# Patient Record
Sex: FEMALE | Race: WHITE
[De-identification: names, ages, dates, MRNs, and addresses within clinical notes are randomized per-mention and may not be internally consistent; named-entity substitution may affect disease eponyms.]

---

## 2018-09-19 ENCOUNTER — HOSPITAL ENCOUNTER (INPATIENT)
Dept: HOSPITAL 62 - ER | Age: 73
LOS: 5 days | Discharge: HOME | DRG: 195 | End: 2018-09-24
Attending: INTERNAL MEDICINE | Admitting: INTERNAL MEDICINE
Payer: MEDICARE

## 2018-09-19 DIAGNOSIS — M19.90: ICD-10-CM

## 2018-09-19 DIAGNOSIS — Z90.49: ICD-10-CM

## 2018-09-19 DIAGNOSIS — G43.909: ICD-10-CM

## 2018-09-19 DIAGNOSIS — Z90.710: ICD-10-CM

## 2018-09-19 DIAGNOSIS — E03.9: ICD-10-CM

## 2018-09-19 DIAGNOSIS — Z53.8: ICD-10-CM

## 2018-09-19 DIAGNOSIS — J18.9: Primary | ICD-10-CM

## 2018-09-19 DIAGNOSIS — Z77.120: ICD-10-CM

## 2018-09-19 DIAGNOSIS — M43.6: ICD-10-CM

## 2018-09-19 DIAGNOSIS — Z88.1: ICD-10-CM

## 2018-09-19 DIAGNOSIS — Z79.899: ICD-10-CM

## 2018-09-19 DIAGNOSIS — D72.829: ICD-10-CM

## 2018-09-19 DIAGNOSIS — Z86.19: ICD-10-CM

## 2018-09-19 LAB
ADD MANUAL DIFF: NO
ALBUMIN SERPL-MCNC: 3.5 G/DL (ref 3.5–5)
ALP SERPL-CCNC: 104 U/L (ref 38–126)
ALT SERPL-CCNC: 43 U/L (ref 9–52)
ANION GAP SERPL CALC-SCNC: 11 MMOL/L (ref 5–19)
APPEARANCE UR: (no result)
APTT PPP: YELLOW S
AST SERPL-CCNC: 38 U/L (ref 14–36)
BASOPHILS # BLD AUTO: 0 10^3/UL (ref 0–0.2)
BASOPHILS NFR BLD AUTO: 0.3 % (ref 0–2)
BILIRUB DIRECT SERPL-MCNC: 0.4 MG/DL (ref 0–0.4)
BILIRUB SERPL-MCNC: 0.5 MG/DL (ref 0.2–1.3)
BILIRUB UR QL STRIP: NEGATIVE
BUN SERPL-MCNC: 12 MG/DL (ref 7–20)
CALCIUM: 10 MG/DL (ref 8.4–10.2)
CHLORIDE SERPL-SCNC: 102 MMOL/L (ref 98–107)
CK MB SERPL-MCNC: 0.56 NG/ML (ref ?–4.55)
CK SERPL-CCNC: 111 U/L (ref 30–135)
CO2 SERPL-SCNC: 23 MMOL/L (ref 22–30)
EOSINOPHIL # BLD AUTO: 0.1 10^3/UL (ref 0–0.6)
EOSINOPHIL NFR BLD AUTO: 1 % (ref 0–6)
ERYTHROCYTE [DISTWIDTH] IN BLOOD BY AUTOMATED COUNT: 13.6 % (ref 11.5–14)
GLUCOSE SERPL-MCNC: 126 MG/DL (ref 75–110)
GLUCOSE UR STRIP-MCNC: NEGATIVE MG/DL
HCT VFR BLD CALC: 39.2 % (ref 36–47)
HGB BLD-MCNC: 13.1 G/DL (ref 12–15.5)
INR PPP: 1.04
KETONES UR STRIP-MCNC: NEGATIVE MG/DL
LYMPHOCYTES # BLD AUTO: 1.3 10^3/UL (ref 0.5–4.7)
LYMPHOCYTES NFR BLD AUTO: 9.8 % (ref 13–45)
MCH RBC QN AUTO: 31.8 PG (ref 27–33.4)
MCHC RBC AUTO-ENTMCNC: 33.5 G/DL (ref 32–36)
MCV RBC AUTO: 95 FL (ref 80–97)
MONOCYTES # BLD AUTO: 0.9 10^3/UL (ref 0.1–1.4)
MONOCYTES NFR BLD AUTO: 6.6 % (ref 3–13)
NEUTROPHILS # BLD AUTO: 11.3 10^3/UL (ref 1.7–8.2)
NEUTS SEG NFR BLD AUTO: 82.3 % (ref 42–78)
NITRITE UR QL STRIP: NEGATIVE
PH UR STRIP: 6 [PH] (ref 5–9)
PLATELET # BLD: 411 10^3/UL (ref 150–450)
POTASSIUM SERPL-SCNC: 4.2 MMOL/L (ref 3.6–5)
PROT SERPL-MCNC: 6.5 G/DL (ref 6.3–8.2)
PROT UR STRIP-MCNC: NEGATIVE MG/DL
PROTHROMBIN TIME: 14.1 SEC (ref 11.4–15.4)
RBC # BLD AUTO: 4.13 10^6/UL (ref 3.72–5.28)
SODIUM SERPL-SCNC: 135.8 MMOL/L (ref 137–145)
SP GR UR STRIP: 1.01
TOTAL CELLS COUNTED % (AUTO): 100 %
TROPONIN I SERPL-MCNC: < 0.012 NG/ML
UROBILINOGEN UR-MCNC: NEGATIVE MG/DL (ref ?–2)
WBC # BLD AUTO: 13.7 10^3/UL (ref 4–10.5)

## 2018-09-19 PROCEDURE — G0378 HOSPITAL OBSERVATION PER HR: HCPCS

## 2018-09-19 PROCEDURE — 84484 ASSAY OF TROPONIN QUANT: CPT

## 2018-09-19 PROCEDURE — 93010 ELECTROCARDIOGRAM REPORT: CPT

## 2018-09-19 PROCEDURE — 71260 CT THORAX DX C+: CPT

## 2018-09-19 PROCEDURE — 87070 CULTURE OTHR SPECIMN AEROBIC: CPT

## 2018-09-19 PROCEDURE — 82553 CREATINE MB FRACTION: CPT

## 2018-09-19 PROCEDURE — 87086 URINE CULTURE/COLONY COUNT: CPT

## 2018-09-19 PROCEDURE — 83605 ASSAY OF LACTIC ACID: CPT

## 2018-09-19 PROCEDURE — 81001 URINALYSIS AUTO W/SCOPE: CPT

## 2018-09-19 PROCEDURE — 99285 EMERGENCY DEPT VISIT HI MDM: CPT

## 2018-09-19 PROCEDURE — 80053 COMPREHEN METABOLIC PANEL: CPT

## 2018-09-19 PROCEDURE — 80048 BASIC METABOLIC PNL TOTAL CA: CPT

## 2018-09-19 PROCEDURE — 85610 PROTHROMBIN TIME: CPT

## 2018-09-19 PROCEDURE — 82550 ASSAY OF CK (CPK): CPT

## 2018-09-19 PROCEDURE — 93005 ELECTROCARDIOGRAM TRACING: CPT

## 2018-09-19 PROCEDURE — 96374 THER/PROPH/DIAG INJ IV PUSH: CPT

## 2018-09-19 PROCEDURE — 85025 COMPLETE CBC W/AUTO DIFF WBC: CPT

## 2018-09-19 PROCEDURE — 96361 HYDRATE IV INFUSION ADD-ON: CPT

## 2018-09-19 PROCEDURE — 36415 COLL VENOUS BLD VENIPUNCTURE: CPT

## 2018-09-19 PROCEDURE — 82803 BLOOD GASES ANY COMBINATION: CPT

## 2018-09-19 PROCEDURE — 71045 X-RAY EXAM CHEST 1 VIEW: CPT

## 2018-09-19 PROCEDURE — 87880 STREP A ASSAY W/OPTIC: CPT

## 2018-09-19 PROCEDURE — 87040 BLOOD CULTURE FOR BACTERIA: CPT

## 2018-09-19 NOTE — ER DOCUMENT REPORT
ED Respiratory Problem





- General


Chief Complaint: Shortness Of Breath


Stated Complaint: LEFT ARM PAIN


Time Seen by Provider: 09/19/18 21:40


Notes: 





Pt. is a 73-year-old female presenting to the emergency department complaining 

of shortness of breath and chest pain.  Patient stated on 9/16 she went to 

[facility] emergency department was diagnosed with pneumonia.  Patient stated she 

was put on Cipro and has been taking it for the last 3 days.  Patient states 

that she feels as though she has had chills and a subjective fever for the last 

10 days today took her temperature and it was 102.5 Fahrenheit.  Stated she was 

having intermittent chest pressure moving down her left arm and into her left 

neck.  Currently is stating she has no chest discomfort.  Patient admits to URI 

symptoms, fever, posttussive vomiting, body aches, headache, joint pain.  

Patient denies any diarrhea or body rashes, also denies abdominal pain, 

dysuria.  Patient has an extensive history of Lyme's disease since 1991 and 

thought she was having a Lyme's flareup 2 weeks ago and called her physician in 

[place] who put her on doxy.  Patient stated she was to take Doxy twice a day 

for 2 weeks and just finished her last dose this morning.  Patient states she 

has felt worn down for the last 2 weeks.  Also admits to being exposed to "

toxic mold" in her house.  Patient denies any hospital admissions over the last 

3 months.





Past medical history: Lyme's disease, hypothyroid, migraines, arthritis 





Medications: Imitrex, Synthroid, progesterone, estrogen implant





Allergies: Rocephin (Pt. stated she was on Rocephin for over a week IV and had 

an increase in her liver enzymes. Pt. denied rash, itching, or trouble breathing

)


TRAVEL OUTSIDE OF THE [place] IN LAST 30 DAYS: No





- Related Data


Allergies/Adverse Reactions: 


 





ceftriaxone [From Rocephin] Allergy (Verified 09/20/18 05:53)


 











Past Medical History





- Social History


Smoking Status: Never Smoker


Drug Abuse: None


Lives with: Family


Family History: Reviewed & Not Pertinent


Patient has suicidal ideation: No


Patient has homicidal ideation: No


Renal/ Medical History: Denies: Hx Peritoneal Dialysis





Review of Systems





- Review of Systems


Constitutional: See HPI


EENT: See HPI


Cardiovascular: See HPI


Respiratory: See HPI


Gastrointestinal: See HPI


Genitourinary: See HPI


Female Genitourinary: No symptoms reported


Musculoskeletal: No symptoms reported


Skin: No symptoms reported


Hematologic/Lymphatic: No symptoms reported


Neurological/Psychological: No symptoms reported





Physical Exam





- Vital signs


Vitals: 


 











Temp Pulse Resp BP Pulse Ox


 


 100.5 F H  100   18   122/61   94 


 


 09/19/18 20:36  09/19/18 20:36  09/19/18 20:36  09/19/18 20:36  09/19/18 20:36














- Notes


Notes: 





GENERAL: Alert, interacts well. No acute distress.  Non-Toxic appearing


HEAD: Normocephalic, atraumatic. FROM neck but pt. does admit to pain back of 

her head down her cervical neck, denies trauma. 


EYES: Pupils equal, round, and reactive to light. Extraocular movements intact.


ENT: Oral mucosa moist, tongue midline. 


NECK: Full range of motion. Supple. Trachea midline. Pt. admits to pain left 

side of neck (paraspinal) and stated some "stiffness" in her cervical neck and 

into her occiput. Denies trauma. 


LUNGS: Slightly diminished bilateral bases no active wheeze, rales or rhonchi 

heard. No respiratory distress.


HEART: Regular rate and rhythm. No murmur


ABDOMEN: Soft, non-tender. Non-distended. Bowel sounds present in all 4 

quadrants.


EXTREMITIES: Moves all 4 extremities spontaneously. No edema, normal radial and 

dorsalis pedis pulses bilaterally. No cyanosis.


BACK: no cervical, thoracic, lumbar midline tenderness. No saddle anesthesia, 

normal distal neurovascular exam. 


NEUROLOGICAL: Alert and oriented x3. Normal speech.  


PSYCH: Normal affect, normal mood.


SKIN: Warm, dry, normal turgor. No rashes or lesions noted.








Course





- Re-evaluation


Re-evalutation: 


Discussed with Pt. due to her leukocytosis without signs of pneumonia, urinary 

tract infection, skin infection, abdominal pain (abdominal infection) a lumbar 

puncture is recommended.  Patient stated she was actually going to ask staff if 

we could do a lumbar puncture in hopes to find a source of her fever. 





LP unsuccessful, due to Pt. presention with headache and neck pain/stiffness, 

likely viral meningitis. Pt. Alert and oriented the entire time in ED, non toxic

, nonencephalopathic. Discussed Pt. presentation with Dr. Abercrombie who also 

agrees with admit. 





Dr. Cabello contacted who stated she will see the pt. in the ED, no other orders 

at this time. 








- Vital Signs


Vital signs: 


 











Temp Pulse Resp BP Pulse Ox


 


 97.9 F   100   15   119/77   95 


 


 09/20/18 03:25  09/19/18 20:54  09/20/18 05:01  09/20/18 05:01  09/20/18 05:01














- Laboratory


Result Diagrams: 


 09/19/18 22:03





 09/19/18 22:03


Laboratory results interpreted by me: 


 











  09/19/18 09/19/18 09/19/18





  22:03 22:03 22:16


 


WBC  13.7 H  


 


Seg Neutrophils %  82.3 H  


 


Lymphocytes %  9.8 L  


 


Absolute Neutrophils  11.3 H  


 


Sodium   135.8 L 


 


Glucose   126 H 


 


AST   38 H 


 


Ur Leukocyte Esterase    TRACE H














Procedures





- Lumbar Puncture


  ** Lumbar puncture


Consent obtained: Yes


Lumbar puncture pre-procedure: Sterile PPE donned, Betadine prep applied


Patient position: Sitting


Needle size: 22


Lumbar puncture location: L4


Anesthetic type: 1% Lidocaine


mL's of anesthetic: 3


Number of attempts: 2


Complications: No


Notes: 


unable to obtain CSF. Pt. tolerated procedure well.





Discharge





- Discharge


Clinical Impression: 


Fever


Qualifiers:


 Fever type: unspecified Qualified Code(s): R50.9 - Fever, unspecified





Headache


Qualifiers:


 Headache type: unspecified Headache chronicity pattern: acute headache 

Intractability: not intractable Qualified Code(s): R51 - Headache





Leukocytosis


Qualifiers:


 Leukocytosis type: unspecified Qualified Code(s): D72.829 - Elevated white 

blood cell count, unspecified





Condition: Stable


Disposition: ADMITTED AS INPATIENT


Admitting Provider: Hospitalist


Unit Admitted: Telemetry

## 2018-09-19 NOTE — EKG REPORT
SEVERITY:- BORDERLINE ECG -

SINUS RHYTHM

PROBABLE LEFT ATRIAL ABNORMALITY

:

Confirmed by: Margret Moreno MD 19-Sep-2018 22:26:48

## 2018-09-20 LAB
ADD MANUAL DIFF: NO
ANION GAP SERPL CALC-SCNC: 8 MMOL/L (ref 5–19)
BASE EXCESS BLDV CALC-SCNC: -1.7 MMOL/L
BASOPHILS # BLD AUTO: 0.1 10^3/UL (ref 0–0.2)
BASOPHILS NFR BLD AUTO: 0.6 % (ref 0–2)
BUN SERPL-MCNC: 11 MG/DL (ref 7–20)
CALCIUM: 9.5 MG/DL (ref 8.4–10.2)
CHLORIDE SERPL-SCNC: 109 MMOL/L (ref 98–107)
CO2 SERPL-SCNC: 24 MMOL/L (ref 22–30)
EOSINOPHIL # BLD AUTO: 0.4 10^3/UL (ref 0–0.6)
EOSINOPHIL NFR BLD AUTO: 3.5 % (ref 0–6)
ERYTHROCYTE [DISTWIDTH] IN BLOOD BY AUTOMATED COUNT: 14.1 % (ref 11.5–14)
GLUCOSE SERPL-MCNC: 99 MG/DL (ref 75–110)
HCO3 BLDV-SCNC: 23 MMOL/L (ref 20–32)
HCT VFR BLD CALC: 39.8 % (ref 36–47)
HGB BLD-MCNC: 13.4 G/DL (ref 12–15.5)
LYMPHOCYTES # BLD AUTO: 2.8 10^3/UL (ref 0.5–4.7)
LYMPHOCYTES NFR BLD AUTO: 25 % (ref 13–45)
MCH RBC QN AUTO: 32.5 PG (ref 27–33.4)
MCHC RBC AUTO-ENTMCNC: 33.7 G/DL (ref 32–36)
MCV RBC AUTO: 97 FL (ref 80–97)
MONOCYTES # BLD AUTO: 0.9 10^3/UL (ref 0.1–1.4)
MONOCYTES NFR BLD AUTO: 8.5 % (ref 3–13)
NEUTROPHILS # BLD AUTO: 6.9 10^3/UL (ref 1.7–8.2)
NEUTS SEG NFR BLD AUTO: 62.4 % (ref 42–78)
PCO2 BLDV: 39 MMHG (ref 35–63)
PH BLDV: 7.39 [PH] (ref 7.3–7.42)
PLATELET # BLD: 350 10^3/UL (ref 150–450)
POTASSIUM SERPL-SCNC: 4.3 MMOL/L (ref 3.6–5)
RBC # BLD AUTO: 4.13 10^6/UL (ref 3.72–5.28)
SODIUM SERPL-SCNC: 140.6 MMOL/L (ref 137–145)
TOTAL CELLS COUNTED % (AUTO): 100 %
WBC # BLD AUTO: 11.1 10^3/UL (ref 4–10.5)

## 2018-09-20 RX ADMIN — Medication SCH: at 22:43

## 2018-09-20 RX ADMIN — CLINDAMYCIN HYDROCHLORIDE SCH MG: 150 CAPSULE ORAL at 22:45

## 2018-09-20 RX ADMIN — CLINDAMYCIN HYDROCHLORIDE SCH MG: 150 CAPSULE ORAL at 14:04

## 2018-09-20 RX ADMIN — FLUTICASONE PROPIONATE SCH SPRAY: 50 SPRAY, METERED NASAL at 22:45

## 2018-09-20 RX ADMIN — CLINDAMYCIN HYDROCHLORIDE SCH MG: 150 CAPSULE ORAL at 06:12

## 2018-09-20 RX ADMIN — Medication SCH ML: at 14:14

## 2018-09-20 RX ADMIN — PREDNISONE SCH MG: 20 TABLET ORAL at 17:32

## 2018-09-20 RX ADMIN — SODIUM CHLORIDE PRN MLS/HR: 9 INJECTION, SOLUTION INTRAVENOUS at 06:12

## 2018-09-20 RX ADMIN — ENOXAPARIN SODIUM SCH MG: 40 INJECTION SUBCUTANEOUS at 10:27

## 2018-09-20 RX ADMIN — Medication SCH ML: at 06:12

## 2018-09-20 NOTE — RADIOLOGY REPORT (SQ)
Chest single view on  9/19/2018 at 10:33 PM 



CLINICAL INDICATION: Shortness of breath



COMPARISON: None



FINDINGS: There is minimal basilar atelectasis. Biapical scarring

is noted. The lungs are otherwise clear. Cardiac, hilar and

mediastinal contours are within normal limits. Pulmonary

vascularity is within normal limits. No bony abnormality is

noted.



IMPRESSION: No acute disease.

## 2018-09-20 NOTE — PDOC H&P
History of Present Illness


Admission Date/PCP: 


  18 03:08





  


Gi Mathews MD


Patient complains of: Fever


History of Present Illness: 


EMILIO ERICKSON is a 73 year old female who comes to the emergency department 

complaining of shaking chills and feeling cold intermittently for about 3 

weeks.  Last  she started with cough, subjective fever, bilateral ear pain

, sore throat, sweating, dry mouth, chills and shaking, she did not take her 

temperature, on Monday she went to [place] as per symptoms persisted and an x-

ray was done and she was told that she has pneumonia, she was placed on 

ciprofloxacin and sent home.  She was told if she was not feeling better in 48 

hours to call her family physician, she did was not in town and was sent to the 

emergency department.  Tells me that she is still have some cough with pinkish 

phlegm, before was yellowish, tells me that she to her temperature at home and 

was 102,5 in the afternoon, has been having some wheezing on and off, shortness 

of breath on and off.  Also has been complaining of headache and some stiff 

neck that by the time he went to see her has almost resolved.  Slice chest 

pressure.


Give me history of epidural injection a few months ago and since then she has 

not been feeling good woozy, dizzy.


Tells me she has history of Lyme disease with relapsed 3, she thought symptoms 

were secondary to this, her PCP placed her on doxycycline for 2 weeks, last 

pill was a couple of days ago.


Tells me that she has had mold problems in her house.


Chest x-ray in the ED negative, T-max 100.5








Past Medical History


Neurological Medical History: Reports: Migraine


Endocrine Medical History: Reports: Hypothyroidism


Musculoskeltal Medical History: Reports: Arthritis


Infectious Medical History: 


   Denies: Other


Infectious History Note: 





Lyme disease





Past Surgical History


Past Surgical History: Reports: Cholecystectomy, Hysterectomy





Social History


Lives with: Family


Smoking Status: Never Smoker


Frequency of Alcohol Use: None


Hx Recreational Drug Use: No


Hx Prescription Drug Abuse: No





Family History


Family History: Reviewed & Not Pertinent


Family History: 





Mother  at 63 years old with liver cirrhosis.  She does not know about her 

father


Parental Family History Reviewed: Yes - As above


Children Family History Reviewed: NA


Sibling(s) Family History Reviewed.: NA





Medication/Allergy


Allergies/Adverse Reactions: 


 





ceftriaxone [From Rocephin] Allergy (Verified 18 05:53)


 











Review of Systems


Review of Systems: 





As outlined above, others negative





Physical Exam


Vital Signs: 


 











Temp Pulse Resp BP Pulse Ox


 


 97.9 F   100   15   119/77   95 


 


 18 03:25  18 20:54  18 05:01  18 05:01  18 05:01











Additional comments: 





General appearance: Well-developed, well-nourished, alert and cooperative, and 

appears to be in no acute distress


Head: Normocephalic


Eyes: PEERL, EOMI, vision is grossly intact.  Ears: External auditory canal and 

tympanic membranes clear, hearing grossly intact.  Nose: No nasal discharge.  

Throat: Oral cavity and pharynx normal.  No inflammation, swelling, exudate or 

lesions.


Neck: Neck supple, nontender without lymphadenopathy, masses or thyromegaly.


Cardiac: Normal S1 and S2.  No S3, S4 or murmurs.  Rhythm is regular.  There is 

no peripheral edema, cyanosis or pallor.  Extremities are warm and well 

perfused.  Capillary refill is less than 2 seconds.  No carotid bruits.


Lungs: Bilateral mild wheezing without rales, rhonchi,  or diminished breath 

sounds.  Not using accessory muscles.


Abdomen: Positive bowel sounds.  Soft.  Nondistended, nontender.  No guarding 

or rebound.  No masses.  No hepatosplenomegaly


Extremities: No significant deformity or joint abnormality.  No edema.  

Peripheral pulses intact.  No varicosities.


Neurological: Cranial nerves II through XII grossly intact.  Strength and 

sensation symmetric and intact throughout.  Reflexes 2+ throughout.


Skin: Skin normal color, texture and turgor with no lesions or eruptions, warm 

and dry.


Psychiatric:  The mental examination revealed the patient was oriented to person

, place, and time.  The patient was able to demonstrate good judgment on recent

, without hallucinations, abnormal affect or abnormal behaviors.





Results


EKG Comments: 





 











  18





  22:03 22:03 22:03


 


WBC  13.7 H  


 


RBC  4.13  


 


Hgb  13.1  


 


Hct  39.2  


 


MCV  95  


 


MCH  31.8  


 


MCHC  33.5  


 


RDW  13.6  


 


Plt Count  411  


 


Seg Neutrophils %  82.3 H  


 


Lymphocytes %  9.8 L  


 


Monocytes %  6.6  


 


Eosinophils %  1.0  


 


Basophils %  0.3  


 


Absolute Neutrophils  11.3 H  


 


Absolute Lymphocytes  1.3  


 


Absolute Monocytes  0.9  


 


Absolute Eosinophils  0.1  


 


Absolute Basophils  0.0  


 


PT   14.1 


 


INR   1.04 


 


VBG pH   


 


VBG pCO2   


 


VBG HCO3   


 


VBG Base Excess   


 


Sodium    135.8 L


 


Potassium    4.2


 


Chloride    102


 


Carbon Dioxide    23


 


Anion Gap    11


 


BUN    12


 


Creatinine    0.74


 


Est GFR ( Amer)    > 60


 


Est GFR (Non-Af Amer)    > 60


 


Glucose    126 H


 


Calcium    10.0


 


Total Bilirubin    0.5


 


Direct Bilirubin    0.4


 


AST    38 H


 


ALT    43


 


Alkaline Phosphatase    104


 


Creatine Kinase    111


 


CK-MB (CK-2)   


 


Troponin I   


 


Total Protein    6.5


 


Albumin    3.5


 


Urine Color   


 


Urine Appearance   


 


Urine pH   


 


Ur Specific Gravity   


 


Urine Protein   


 


Urine Glucose (UA)   


 


Urine Ketones   


 


Urine Blood   


 


Urine Nitrite   


 


Urine Bilirubin   


 


Urine Urobilinogen   


 


Ur Leukocyte Esterase   


 


Urine WBC (Auto)   


 


Urine RBC (Auto)   


 


Squamous Epi Cells Auto   


 


Urine Ascorbic Acid   














  18





  22:03 22:16 00:30


 


WBC   


 


RBC   


 


Hgb   


 


Hct   


 


MCV   


 


MCH   


 


MCHC   


 


RDW   


 


Plt Count   


 


Seg Neutrophils %   


 


Lymphocytes %   


 


Monocytes %   


 


Eosinophils %   


 


Basophils %   


 


Absolute Neutrophils   


 


Absolute Lymphocytes   


 


Absolute Monocytes   


 


Absolute Eosinophils   


 


Absolute Basophils   


 


PT   


 


INR   


 


VBG pH    7.39


 


VBG pCO2    39.0


 


VBG HCO3    23.0


 


VBG Base Excess    -1.7


 


Sodium   


 


Potassium   


 


Chloride   


 


Carbon Dioxide   


 


Anion Gap   


 


BUN   


 


Creatinine   


 


Est GFR ( Amer)   


 


Est GFR (Non-Af Amer)   


 


Glucose   


 


Calcium   


 


Total Bilirubin   


 


Direct Bilirubin   


 


AST   


 


ALT   


 


Alkaline Phosphatase   


 


Creatine Kinase   


 


CK-MB (CK-2)  0.56  


 


Troponin I  < 0.012  


 


Total Protein   


 


Albumin   


 


Urine Color   YELLOW 


 


Urine Appearance   SLIGHTLY-CLOUDY 


 


Urine pH   6.0 


 


Ur Specific Gravity   1.006 


 


Urine Protein   NEGATIVE 


 


Urine Glucose (UA)   NEGATIVE 


 


Urine Ketones   NEGATIVE 


 


Urine Blood   NEGATIVE 


 


Urine Nitrite   NEGATIVE 


 


Urine Bilirubin   NEGATIVE 


 


Urine Urobilinogen   NEGATIVE 


 


Ur Leukocyte Esterase   TRACE H 


 


Urine WBC (Auto)   6 


 


Urine RBC (Auto)   1 


 


Squamous Epi Cells Auto   12 


 


Urine Ascorbic Acid   NEGATIVE 














  18





  02:41


 


WBC 


 


RBC 


 


Hgb 


 


Hct 


 


MCV 


 


MCH 


 


MCHC 


 


RDW 


 


Plt Count 


 


Seg Neutrophils % 


 


Lymphocytes % 


 


Monocytes % 


 


Eosinophils % 


 


Basophils % 


 


Absolute Neutrophils 


 


Absolute Lymphocytes 


 


Absolute Monocytes 


 


Absolute Eosinophils 


 


Absolute Basophils 


 


PT 


 


INR 


 


VBG pH 


 


VBG pCO2 


 


VBG HCO3 


 


VBG Base Excess 


 


Sodium 


 


Potassium 


 


Chloride 


 


Carbon Dioxide 


 


Anion Gap 


 


BUN 


 


Creatinine 


 


Est GFR ( Amer) 


 


Est GFR (Non-Af Amer) 


 


Glucose 


 


Calcium 


 


Total Bilirubin 


 


Direct Bilirubin 


 


AST 


 


ALT 


 


Alkaline Phosphatase 


 


Creatine Kinase 


 


CK-MB (CK-2) 


 


Troponin I  < 0.012


 


Total Protein 


 


Albumin 


 


Urine Color 


 


Urine Appearance 


 


Urine pH 


 


Ur Specific Gravity 


 


Urine Protein 


 


Urine Glucose (UA) 


 


Urine Ketones 


 


Urine Blood 


 


Urine Nitrite 


 


Urine Bilirubin 


 


Urine Urobilinogen 


 


Ur Leukocyte Esterase 


 


Urine WBC (Auto) 


 


Urine RBC (Auto) 


 


Squamous Epi Cells Auto 


 


Urine Ascorbic Acid 








Impressions: 


 





Chest X-Ray  18 21:41


IMPRESSION: No acute disease. 


 














Assessment & Plan





- Diagnosis


(1) Fever


Qualifiers: 


   Fever type: unspecified   Qualified Code(s): R50.9 - Fever, unspecified   


Is this a current diagnosis for this admission?: Yes   


Plan: 


Patient comes with respiratory symptoms and some general symptoms, states fever 

at home was 102.5, and daily 100.5.  As per her history I believe she started 

with upper respiratory infection last  which is resolving now with 

reactive airway disease.  It is unclear to me if she had pneumonia or not has 

been too soon for the infiltrates to completely resolve in the chest x-ray, 

will have to get the records, she might be diagnosed with clinical pneumonia.  

I am placing the patient on p.o. clindamycin.  Do not feel that the patient 

needs to be on IV antibiotics however we have to follow her blood cultures.


Mold at home and be playing a role in her symptomatology.  Patient is non-

smoker.  2 L of IV fluids given in the ED. Leukocytosis of 13.7 with left shift.


There was a concern in the ED for meningitis, as she was complaining of some 

headaches and neck stiffness but symptoms at the time of my evaluation are not 

consistent with it, do not feel the patient needs an LP.


Patient recently treated for a possible Lyme disease flare, completed 2 weeks 

of doxycycline.








(2) Hypothyroidism


Is this a current diagnosis for this admission?: Yes   


Plan: 


Continue with home medication








- Time


Time Spent: 30 to 50 Minutes

## 2018-09-20 NOTE — PROGRESS NOTE
Provider Note


Provider Note: 


This patient was admitted early this morning by my colleague, Evy Cabello. The 

patient is resting comfortably. She states that she came in due to generalized 

weakness and dizziness and generally not feeling well. She also states that she 

had a migraine this morning. She has received Imitrex for her migraine this 

morning. She is awake, alert, and oriented x 3. No acute distress. Heart and 

lung sounds are within normal limits. The abdomen is soft, non-tender, non-

distended.She complains of having upper respiratory symptoms. Will treat URI 

with flonase and prednisone.

## 2018-09-21 PROCEDURE — 00JU3ZZ INSPECTION OF SPINAL CANAL, PERCUTANEOUS APPROACH: ICD-10-PCS | Performed by: EMERGENCY MEDICINE

## 2018-09-21 RX ADMIN — CLINDAMYCIN HYDROCHLORIDE SCH MG: 150 CAPSULE ORAL at 22:18

## 2018-09-21 RX ADMIN — CLINDAMYCIN HYDROCHLORIDE SCH MG: 150 CAPSULE ORAL at 15:15

## 2018-09-21 RX ADMIN — Medication SCH ML: at 15:14

## 2018-09-21 RX ADMIN — LEVOFLOXACIN SCH MLS/HR: 750 INJECTION, SOLUTION INTRAVENOUS at 17:28

## 2018-09-21 RX ADMIN — BENZONATATE SCH MG: 100 CAPSULE ORAL at 22:14

## 2018-09-21 RX ADMIN — ZOLPIDEM TARTRATE PRN MG: 5 TABLET ORAL at 22:14

## 2018-09-21 RX ADMIN — IPRATROPIUM BROMIDE AND ALBUTEROL SULFATE PRN ML: 2.5; .5 SOLUTION RESPIRATORY (INHALATION) at 08:09

## 2018-09-21 RX ADMIN — SODIUM CHLORIDE PRN MLS/HR: 9 INJECTION, SOLUTION INTRAVENOUS at 22:15

## 2018-09-21 RX ADMIN — PREDNISONE SCH MG: 20 TABLET ORAL at 17:29

## 2018-09-21 RX ADMIN — FLUTICASONE PROPIONATE SCH SPRAY: 50 SPRAY, METERED NASAL at 09:59

## 2018-09-21 RX ADMIN — GUAIFENESIN PRN MG: 200 SOLUTION ORAL at 17:29

## 2018-09-21 RX ADMIN — BENZONATATE SCH MG: 100 CAPSULE ORAL at 15:14

## 2018-09-21 RX ADMIN — ZOLPIDEM TARTRATE PRN MG: 5 TABLET ORAL at 00:17

## 2018-09-21 RX ADMIN — GUAIFENESIN PRN MG: 200 SOLUTION ORAL at 09:59

## 2018-09-21 RX ADMIN — Medication SCH: at 22:14

## 2018-09-21 RX ADMIN — Medication SCH: at 06:22

## 2018-09-21 RX ADMIN — FLUTICASONE PROPIONATE SCH SPRAY: 50 SPRAY, METERED NASAL at 22:14

## 2018-09-21 RX ADMIN — ENOXAPARIN SODIUM SCH MG: 40 INJECTION SUBCUTANEOUS at 09:59

## 2018-09-21 RX ADMIN — CLINDAMYCIN HYDROCHLORIDE SCH MG: 150 CAPSULE ORAL at 06:44

## 2018-09-22 RX ADMIN — Medication SCH ML: at 21:38

## 2018-09-22 RX ADMIN — BENZONATATE SCH MG: 100 CAPSULE ORAL at 05:14

## 2018-09-22 RX ADMIN — Medication SCH ML: at 13:28

## 2018-09-22 RX ADMIN — FLUTICASONE PROPIONATE SCH SPRAY: 50 SPRAY, METERED NASAL at 09:32

## 2018-09-22 RX ADMIN — PREDNISONE SCH MG: 20 TABLET ORAL at 12:12

## 2018-09-22 RX ADMIN — FLUTICASONE PROPIONATE SCH SPRAY: 50 SPRAY, METERED NASAL at 21:39

## 2018-09-22 RX ADMIN — ENOXAPARIN SODIUM SCH: 40 INJECTION SUBCUTANEOUS at 09:37

## 2018-09-22 RX ADMIN — IPRATROPIUM BROMIDE AND ALBUTEROL SULFATE PRN ML: 2.5; .5 SOLUTION RESPIRATORY (INHALATION) at 07:57

## 2018-09-22 RX ADMIN — ZOLPIDEM TARTRATE PRN MG: 5 TABLET ORAL at 21:38

## 2018-09-22 RX ADMIN — LEVOFLOXACIN SCH MLS/HR: 750 INJECTION, SOLUTION INTRAVENOUS at 09:31

## 2018-09-22 RX ADMIN — CLINDAMYCIN HYDROCHLORIDE SCH MG: 150 CAPSULE ORAL at 13:28

## 2018-09-22 RX ADMIN — CLINDAMYCIN HYDROCHLORIDE SCH MG: 150 CAPSULE ORAL at 05:14

## 2018-09-22 RX ADMIN — CLINDAMYCIN HYDROCHLORIDE SCH MG: 150 CAPSULE ORAL at 21:38

## 2018-09-22 RX ADMIN — Medication SCH: at 05:15

## 2018-09-22 RX ADMIN — BENZONATATE SCH MG: 100 CAPSULE ORAL at 13:28

## 2018-09-22 RX ADMIN — BENZONATATE SCH MG: 100 CAPSULE ORAL at 21:38

## 2018-09-22 RX ADMIN — PREDNISONE SCH MG: 20 TABLET ORAL at 17:27

## 2018-09-22 NOTE — PDOC PROGRESS REPORT
Subjective


Progress Note for:: 09/21/18


Subjective:: 





Still c/o cough and headache


Reason For Visit: 


FEVER








Physical Exam


Vital Signs: 


 











Temp Pulse Resp BP Pulse Ox


 


 97.9 F   83   14   124/62   93 


 


 09/22/18 07:22  09/22/18 07:57  09/22/18 07:57  09/22/18 07:22  09/22/18 07:57








 Intake & Output











 09/21/18 09/22/18 09/23/18





 06:59 06:59 06:59


 


Intake Total  2650 


 


Output Total  1700 


 


Balance  950 











General appearance: PRESENT: no acute distress, well-developed, well-nourished


Head exam: PRESENT: atraumatic, normocephalic


Eye exam: PRESENT: conjunctiva pink, EOMI, PERRLA.  ABSENT: scleral icterus


Ear exam: PRESENT: normal external ear exam


Mouth exam: PRESENT: moist, tongue midline


Neck exam: ABSENT: carotid bruit, JVD, lymphadenopathy, thyromegaly


Respiratory exam: PRESENT: crackles, decreased breath sounds, rhonchi, 

unlabored.  ABSENT: rales, wheezes


Cardiovascular exam: PRESENT: RRR, +S1, +S2.  ABSENT: diastolic murmur, rubs, 

systolic murmur


Pulses: PRESENT: normal dorsalis pedis pul


Vascular exam: PRESENT: normal capillary refill


GI/Abdominal exam: PRESENT: normal bowel sounds, soft.  ABSENT: distended, 

guarding, mass, organolmegaly, rebound, tenderness


Rectal exam: PRESENT: deferred


Extremities exam: PRESENT: full ROM.  ABSENT: calf tenderness, clubbing, pedal 

edema


Neurological exam: PRESENT: alert, awake, oriented to person, oriented to place

, oriented to time, oriented to situation, CN II-XII grossly intact.  ABSENT: 

motor sensory deficit


Psychiatric exam: PRESENT: appropriate affect, normal mood.  ABSENT: homicidal 

ideation, suicidal ideation


Skin exam: PRESENT: dry, intact, warm.  ABSENT: cyanosis, rash





Results


Impressions: 


 





Chest X-Ray  09/19/18 21:41


IMPRESSION: No acute disease. 


 














Assessment & Plan





- Time


Time Spent with patient: 15-24 minutes


Medications reviewed and adjusted accordingly: Yes


Anticipated discharge: Home


Within: within 72 hours





- Inpatient Certification


Based on my medical assessment, after consideration of the patient's 

comorbidities, presenting symptoms, or acuity I expect that the services needed 

warrant INPATIENT care.: Yes


Medical Necessity: Need Close Monitoring Due to Risk of Patient Decompensation, 

Need for IV Antibiotics





- Plan Summary


Plan Summary: 





1. Possible atypical Pneumonitis- lopez add Levaquin to regimen, cont clindamycin 

and reevaluate in am


2, Fever- resolved.


3, No clinical evidence of meningitis. Lopez continue to monitor

## 2018-09-22 NOTE — PDOC PROGRESS REPORT
Subjective


Progress Note for:: 09/22/18


Subjective:: 





Still c/o cough with brownish sputum


No headache, nausea or vomiting


Reason For Visit: 


FEVER








Physical Exam


Vital Signs: 


 











Temp Pulse Resp BP Pulse Ox


 


 97.9 F   83   14   124/62   93 


 


 09/22/18 07:22  09/22/18 07:57  09/22/18 07:57  09/22/18 07:22  09/22/18 07:57








 Intake & Output











 09/21/18 09/22/18 09/23/18





 06:59 06:59 06:59


 


Intake Total  2650 


 


Output Total  1700 


 


Balance  950 











General appearance: PRESENT: no acute distress, well-developed, well-nourished


Head exam: PRESENT: atraumatic, normocephalic


Eye exam: PRESENT: conjunctiva pink, EOMI, PERRLA.  ABSENT: scleral icterus


Ear exam: PRESENT: normal external ear exam


Mouth exam: PRESENT: moist, tongue midline


Neck exam: ABSENT: carotid bruit, JVD, lymphadenopathy, thyromegaly


Respiratory exam: PRESENT: crackles, decreased breath sounds, rhonchi, 

unlabored.  ABSENT: rales, tachypnea, wheezes


Cardiovascular exam: PRESENT: RRR.  ABSENT: diastolic murmur, rubs, systolic 

murmur


Pulses: PRESENT: normal dorsalis pedis pul


Vascular exam: PRESENT: normal capillary refill


GI/Abdominal exam: PRESENT: normal bowel sounds, soft.  ABSENT: distended, 

guarding, mass, organolmegaly, rebound, tenderness


Rectal exam: PRESENT: deferred


Extremities exam: PRESENT: full ROM.  ABSENT: calf tenderness, clubbing, pedal 

edema


Neurological exam: PRESENT: alert, awake, oriented to person, oriented to place

, oriented to time, oriented to situation, CN II-XII grossly intact.  ABSENT: 

motor sensory deficit


Psychiatric exam: PRESENT: appropriate affect, normal mood.  ABSENT: homicidal 

ideation, suicidal ideation


Skin exam: PRESENT: dry, intact, warm.  ABSENT: cyanosis, rash





Results


Laboratory Results: 





 





 09/20/18 06:15 





 09/20/18 06:15 





 











MCV  97 fl (80-97)   09/20/18  06:15    


 


MCH  32.5 pg (27.0-33.4)   09/20/18  06:15    


 


MCHC  33.7 g/dL (32.0-36.0)   09/20/18  06:15    


 


RDW  14.1 % (11.5-14.0)  H  09/20/18  06:15    


 


Seg Neutrophils %  62.4 % (42-78)   09/20/18  06:15    


 


Lymphocytes %  25.0 % (13-45)   09/20/18  06:15    


 


Monocytes %  8.5 % (3-13)   09/20/18  06:15    


 


Eosinophils %  3.5 % (0-6)   09/20/18  06:15    


 


Basophils %  0.6 % (0-2)   09/20/18  06:15    


 


Absolute Neutrophils  6.9 10^3/uL (1.7-8.2)   09/20/18  06:15    


 


Absolute Lymphocytes  2.8 10^3/uL (0.5-4.7)   09/20/18  06:15    


 


Absolute Monocytes  0.9 10^3/uL (0.1-1.4)   09/20/18  06:15    


 


Absolute Eosinophils  0.4 10^3/uL (0.0-0.6)   09/20/18  06:15    


 


Absolute Basophils  0.1 10^3/uL (0.0-0.2)   09/20/18  06:15    


 


VBG pH  7.39  (7.30-7.42)   09/20/18  00:30    


 


VBG pCO2  39.0 mmHg (35-63)   09/20/18  00:30    


 


VBG HCO3  23.0 mmol/L (20-32)   09/20/18  00:30    


 


VBG Base Excess  -1.7 mmol/L  09/20/18  00:30    


 


Chloride  109 mmol/L ()  H  09/20/18  06:15    


 


Carbon Dioxide  24 mmol/L (22-30)   09/20/18  06:15    


 


Anion Gap  8  (5-19)   09/20/18  06:15    


 


Est GFR ( Amer)  > 60  (>60)   09/20/18  06:15    


 


Est GFR (Non-Af Amer)  > 60  (>60)   09/20/18  06:15    


 


Glucose  99 mg/dL ()   09/20/18  06:15    


 


Lactic Acid  1.2 mmol/L (0.7-2.1)   09/19/18  22:03    


 


Calcium  9.5 mg/dL (8.4-10.2)   09/20/18  06:15    


 


Total Bilirubin  0.5 mg/dL (0.2-1.3)   09/19/18  22:03    


 


AST  38 U/L (14-36)  H  09/19/18  22:03    


 


ALT  43 U/L (9-52)   09/19/18  22:03    


 


Alkaline Phosphatase  104 U/L ()   09/19/18  22:03    


 


Total Protein  6.5 g/dL (6.3-8.2)   09/19/18  22:03    


 


Albumin  3.5 g/dL (3.5-5.0)   09/19/18  22:03    


 


Urine Color  YELLOW   09/19/18  22:16    


 


Urine Appearance  SLIGHTLY-CLOUDY   09/19/18  22:16    


 


Urine pH  6.0  (5.0-9.0)   09/19/18  22:16    


 


Ur Specific Gravity  1.006   09/19/18  22:16    


 


Urine Protein  NEGATIVE mg/dL (NEGATIVE)   09/19/18  22:16    


 


Urine Glucose (UA)  NEGATIVE mg/dL (NEGATIVE)   09/19/18  22:16    


 


Urine Ketones  NEGATIVE mg/dL (NEGATIVE)   09/19/18  22:16    


 


Urine Blood  NEGATIVE  (NEGATIVE)   09/19/18  22:16    


 


Urine Nitrite  NEGATIVE  (NEGATIVE)   09/19/18  22:16    


 


Ur Leukocyte Esterase  TRACE  (NEGATIVE)  H  09/19/18  22:16    


 


Urine WBC (Auto)  6 /HPF  09/19/18  22:16    


 


Urine RBC (Auto)  1 /HPF  09/19/18  22:16    








 





09/20/18 06:15   Throat   Throat Culture - Final


                            NORMAL CATE


09/19/18 22:16   Clean Catch Midstream   Urine Culture - Final


                            NO GROWTH 2 DAYS





 











  09/19/18 09/19/18 09/20/18





  22:03 22:03 02:41


 


Creatine Kinase  111  


 


CK-MB (CK-2)   0.56 


 


Troponin I   < 0.012  < 0.012











Impressions: 


 





Chest X-Ray  09/19/18 21:41


IMPRESSION: No acute disease. 


 














Assessment & Plan





- Time


Time Spent with patient: 15-24 minutes


Medications reviewed and adjusted accordingly: Yes


Anticipated discharge: Home


Within: within 72 hours





- Inpatient Certification


Based on my medical assessment, after consideration of the patient's 

comorbidities, presenting symptoms, or acuity I expect that the services needed 

warrant INPATIENT care.: Yes


Medical Necessity: Need for IV Antibiotics, Risk of Complication if Not Cared 

For in Hospital





- Plan Summary


Plan Summary: 





Possible pnemonitis, atypical versus bronchitis- Cont Levaquin.


Obtain CT chest as she still has bronchial breathing


Cont steroids, bronchodilators

## 2018-09-23 LAB
ADD MANUAL DIFF: NO
ANION GAP SERPL CALC-SCNC: 5 MMOL/L (ref 5–19)
BASOPHILS # BLD AUTO: 0 10^3/UL (ref 0–0.2)
BASOPHILS NFR BLD AUTO: 0.3 % (ref 0–2)
BUN SERPL-MCNC: 13 MG/DL (ref 7–20)
CALCIUM: 9.2 MG/DL (ref 8.4–10.2)
CHLORIDE SERPL-SCNC: 108 MMOL/L (ref 98–107)
CO2 SERPL-SCNC: 26 MMOL/L (ref 22–30)
EOSINOPHIL # BLD AUTO: 0 10^3/UL (ref 0–0.6)
EOSINOPHIL NFR BLD AUTO: 0 % (ref 0–6)
ERYTHROCYTE [DISTWIDTH] IN BLOOD BY AUTOMATED COUNT: 13.8 % (ref 11.5–14)
GLUCOSE SERPL-MCNC: 120 MG/DL (ref 75–110)
HCT VFR BLD CALC: 35 % (ref 36–47)
HGB BLD-MCNC: 11.9 G/DL (ref 12–15.5)
LYMPHOCYTES # BLD AUTO: 1.7 10^3/UL (ref 0.5–4.7)
LYMPHOCYTES NFR BLD AUTO: 16.9 % (ref 13–45)
MCH RBC QN AUTO: 32.1 PG (ref 27–33.4)
MCHC RBC AUTO-ENTMCNC: 34 G/DL (ref 32–36)
MCV RBC AUTO: 95 FL (ref 80–97)
MONOCYTES # BLD AUTO: 0.5 10^3/UL (ref 0.1–1.4)
MONOCYTES NFR BLD AUTO: 4.7 % (ref 3–13)
NEUTROPHILS # BLD AUTO: 8.1 10^3/UL (ref 1.7–8.2)
NEUTS SEG NFR BLD AUTO: 78.1 % (ref 42–78)
PLATELET # BLD: 441 10^3/UL (ref 150–450)
POTASSIUM SERPL-SCNC: 4.3 MMOL/L (ref 3.6–5)
RBC # BLD AUTO: 3.7 10^6/UL (ref 3.72–5.28)
SODIUM SERPL-SCNC: 138.9 MMOL/L (ref 137–145)
TOTAL CELLS COUNTED % (AUTO): 100 %
WBC # BLD AUTO: 10.3 10^3/UL (ref 4–10.5)

## 2018-09-23 RX ADMIN — CLINDAMYCIN HYDROCHLORIDE SCH MG: 150 CAPSULE ORAL at 22:07

## 2018-09-23 RX ADMIN — FLUTICASONE PROPIONATE SCH SPRAY: 50 SPRAY, METERED NASAL at 22:08

## 2018-09-23 RX ADMIN — BENZONATATE SCH MG: 100 CAPSULE ORAL at 13:40

## 2018-09-23 RX ADMIN — CLINDAMYCIN HYDROCHLORIDE SCH MG: 150 CAPSULE ORAL at 06:05

## 2018-09-23 RX ADMIN — PREDNISONE SCH MG: 20 TABLET ORAL at 17:16

## 2018-09-23 RX ADMIN — Medication SCH ML: at 22:14

## 2018-09-23 RX ADMIN — BENZONATATE SCH MG: 100 CAPSULE ORAL at 06:05

## 2018-09-23 RX ADMIN — BENZONATATE SCH MG: 100 CAPSULE ORAL at 22:08

## 2018-09-23 RX ADMIN — ZOLPIDEM TARTRATE PRN MG: 5 TABLET ORAL at 22:08

## 2018-09-23 RX ADMIN — Medication SCH ML: at 13:40

## 2018-09-23 RX ADMIN — ENOXAPARIN SODIUM SCH: 40 INJECTION SUBCUTANEOUS at 09:24

## 2018-09-23 RX ADMIN — Medication SCH ML: at 06:05

## 2018-09-23 RX ADMIN — LEVOFLOXACIN SCH MLS/HR: 750 INJECTION, SOLUTION INTRAVENOUS at 09:22

## 2018-09-23 RX ADMIN — CLINDAMYCIN HYDROCHLORIDE SCH MG: 150 CAPSULE ORAL at 13:40

## 2018-09-23 RX ADMIN — FLUTICASONE PROPIONATE SCH SPRAY: 50 SPRAY, METERED NASAL at 09:23

## 2018-09-23 RX ADMIN — PREDNISONE SCH MG: 20 TABLET ORAL at 09:23

## 2018-09-23 NOTE — PDOC PROGRESS REPORT
Subjective


Progress Note for:: 09/23/18


Subjective:: 





Still c/o cough with brownish sputum


No headache, nausea or vomiting


Reason For Visit: 


FEVER








Physical Exam


Vital Signs: 


 











Temp Pulse Resp BP Pulse Ox


 


 98.1 F   76   16   123/59 L  97 


 


 09/23/18 16:00  09/23/18 16:00  09/23/18 16:00  09/23/18 16:00  09/23/18 16:00








 Intake & Output











 09/22/18 09/23/18 09/24/18





 06:59 06:59 06:59


 


Intake Total 2650 3111 150


 


Output Total 1700 2900 


 


Balance 950 211 150











General appearance: PRESENT: no acute distress, well-developed, well-nourished


Head exam: PRESENT: atraumatic, normocephalic


Eye exam: PRESENT: conjunctiva pink, EOMI, PERRLA.  ABSENT: scleral icterus


Ear exam: PRESENT: normal external ear exam


Mouth exam: PRESENT: moist, tongue midline


Neck exam: ABSENT: carotid bruit, JVD, lymphadenopathy, thyromegaly


Respiratory exam: PRESENT: crackles, decreased breath sounds, rhonchi.  ABSENT: 

rales, wheezes


Cardiovascular exam: PRESENT: RRR.  ABSENT: diastolic murmur, rubs, systolic 

murmur


Pulses: PRESENT: normal dorsalis pedis pul


Vascular exam: PRESENT: normal capillary refill


GI/Abdominal exam: PRESENT: normal bowel sounds, soft.  ABSENT: distended, 

guarding, mass, organolmegaly, rebound, tenderness


Rectal exam: PRESENT: deferred


Extremities exam: PRESENT: full ROM.  ABSENT: calf tenderness, clubbing, pedal 

edema


Neurological exam: PRESENT: alert, awake, oriented to person, oriented to place

, oriented to time, oriented to situation, CN II-XII grossly intact.  ABSENT: 

motor sensory deficit


Psychiatric exam: PRESENT: appropriate affect, normal mood.  ABSENT: homicidal 

ideation, suicidal ideation


Skin exam: PRESENT: dry, intact, warm.  ABSENT: cyanosis, rash





Results


Laboratory Results: 


 





 09/23/18 04:25 





 09/23/18 04:25 





 











  09/23/18 09/23/18





  04:25 04:25


 


WBC  10.3 


 


RBC  3.70 L 


 


Hgb  11.9 L 


 


Hct  35.0 L 


 


MCV  95 


 


MCH  32.1 


 


MCHC  34.0 


 


RDW  13.8 


 


Plt Count  441 


 


Seg Neutrophils %  78.1 H 


 


Lymphocytes %  16.9 


 


Monocytes %  4.7 


 


Eosinophils %  0.0 


 


Basophils %  0.3 


 


Absolute Neutrophils  8.1 


 


Absolute Lymphocytes  1.7 


 


Absolute Monocytes  0.5 


 


Absolute Eosinophils  0.0 


 


Absolute Basophils  0.0 


 


Sodium   138.9


 


Potassium   4.3


 


Chloride   108 H


 


Carbon Dioxide   26


 


Anion Gap   5


 


BUN   13


 


Creatinine   0.59


 


Est GFR ( Amer)   > 60


 


Est GFR (Non-Af Amer)   > 60


 


Glucose   120 H


 


Calcium   9.2











Impressions: 


 





Chest X-Ray  09/19/18 21:41


IMPRESSION: No acute disease. 


 








Chest CT  09/22/18 00:00


IMPRESSION:  NORMAL CT OF THE CHEST WITH IV CONTRAST.


 














Assessment & Plan





- Time


Time Spent with patient: 15-24 minutes


Medications reviewed and adjusted accordingly: Yes


Anticipated discharge: Home


Within: within 24 hours





- Inpatient Certification


Based on my medical assessment, after consideration of the patient's 

comorbidities, presenting symptoms, or acuity I expect that the services needed 

warrant INPATIENT care.: Yes


Medical Necessity: Failure to Improve With Outpatient Therapy, Need Close 

Monitoring Due to Risk of Patient Decompensation, Need for Nebulizer Therapy 

and Monitoring of Response





- Plan Summary


Plan Summary: 








Possible pnemonitis, atypical versus bronchitis- Cont Levaquin.


CT chest negative.


Breathing is improved on steroids


Cont steroids, bronchodilators


Plan to dc in am

## 2018-09-24 VITALS — SYSTOLIC BLOOD PRESSURE: 123 MMHG | DIASTOLIC BLOOD PRESSURE: 59 MMHG

## 2018-09-24 RX ADMIN — BENZONATATE SCH MG: 100 CAPSULE ORAL at 06:18

## 2018-09-24 RX ADMIN — FLUTICASONE PROPIONATE SCH SPRAY: 50 SPRAY, METERED NASAL at 09:17

## 2018-09-24 RX ADMIN — Medication SCH ML: at 06:18

## 2018-09-24 RX ADMIN — ENOXAPARIN SODIUM SCH: 40 INJECTION SUBCUTANEOUS at 09:20

## 2018-09-24 RX ADMIN — PREDNISONE SCH MG: 20 TABLET ORAL at 09:17

## 2018-09-24 RX ADMIN — LEVOFLOXACIN SCH MLS/HR: 750 INJECTION, SOLUTION INTRAVENOUS at 09:17

## 2018-09-24 RX ADMIN — IPRATROPIUM BROMIDE AND ALBUTEROL SULFATE PRN ML: 2.5; .5 SOLUTION RESPIRATORY (INHALATION) at 08:52

## 2018-09-24 RX ADMIN — CLINDAMYCIN HYDROCHLORIDE SCH MG: 150 CAPSULE ORAL at 06:18

## 2018-09-24 NOTE — PDOC DISCHARGE SUMMARY
General





- Admit/Disc Date/PCP


Admission Date/Primary Care Provider: 


  09/21/18 16:52





  





Discharge Date: 09/24/18





- Additional Information


Resuscitation Status: Full Code


Discharge Diet: Regular


Discharge Activity: Activity As Tolerated


Prescriptions: 


Benzonatate [Tessalon Perles 100 mg Capsule] 100 mg PO Q8 #20 capsule


Clindamycin HCl [Cleocin 150 mg Capsule] 300 mg PO Q8 #14 capsule


Fluticasone Propionate [Flonase Nasal Spray 50 Mcg/Spray 16 gm] 1 spray NASL 

Q12 #1 spray.pump


Levofloxacin [Levaquin 500 mg Tablet] 500 mg PO DAILY #5 tablet


Methylprednisolone [Medrol Dosepack (4 mg/Tab) 21 Tab/Dosepak] 4 mg PO ASDIR 

PRN #21 tab.ds.pk


 PRN Reason: 


Home Medications: 








Acetaminophen [Tylenol Extra Strength] 500 mg PO Q4HP PRN 09/20/18 


Ascorbic Acid [Vitamin C 500 mg Tablet] 500 mg PO BID 09/20/18 


Calcium Carbonate/Vitamin D3 [Calcium 600 + Vit D Tablet] 1 tab PO BID 09/20/18 


Sumatriptan Succinate [Imitrex 100 mg Tablet] 33.3 mg PO ASDIR PRN 09/20/18 


Thyroid,Pork [[place] Thyroid] 30 mg PO QAM 09/20/18 


Zolpidem Tartrate [Ambien 5 mg Tablet] 5 mg PO QHS 09/20/18 


Benzonatate [Tessalon Perles 100 mg Capsule] 100 mg PO Q8 #20 capsule 09/24/18 


Clindamycin HCl [Cleocin 150 mg Capsule] 300 mg PO Q8 #14 capsule 09/24/18 


Fluticasone Propionate [Flonase Nasal Spray 50 Mcg/Spray 16 gm] 1 spray NASL 

Q12 #1 spray.pump 09/24/18 


Guaifenesin [Robitussin Syrup 200 mg/10 ml Ud Cup] 200 mg PO Q4HP PRN  udc 09/24 /18 


Levofloxacin [Levaquin 500 mg Tablet] 500 mg PO DAILY #5 tablet 09/24/18 


Methylprednisolone [Medrol Dosepack (4 mg/Tab) 21 Tab/Dosepak] 4 mg PO ASDIR 

PRN #21 tab.ds.pk 09/24/18 











History of Present Illness


History of Present Illness: 


EMILIO ERICKSON is a 73 year old female


ALEXIALEXANDER MIRELESMOND is a 73 year old female who comes to the emergency department 

complaining of shaking chills and feeling cold intermittently for about 3 

weeks.  Last Sunday she started with cough, subjective fever, bilateral ear pain

, sore throat, sweating, dry mouth, chills and shaking, she did not take her 

temperature, on Monday she went to [facility] as per symptoms persisted and an x-

ray was done and she was told that she has pneumonia, she was placed on 

ciprofloxacin and sent home.  She was told if she was not feeling better in 48 

hours to call her family physician, she did was not in town and was sent to the 

emergency department.  Tells me that she is still have some cough with pinkish 

phlegm, before was yellowish, tells me that she to her temperature at home and 

was 102,5 in the afternoon, has been having some wheezing on and off, shortness 

of breath on and off.  Also has been complaining of headache and some stiff 

neck that by the time he went to see her has almost re


Chest x-ray in the ED negative, T-max 100.5





Hospital Course


Hospital Course: 





Patient was admitted with difficulty breathing and she was thought to have 

either partially treated pneumonia or atypical pneumonitis.  She was started on 

antibiotics.  She was apparently febrile on initial presentation.  Subsequent 

CT scan done reveals no evidence of any infiltrates.  Patient was placed on 

steroids and she actually improved once steroid was started on a higher dose.  

She was also placed on Levaquin in addition to clindamycin and patient 

continues to improve with resolution of her bronchial breathing and brought 

care of breath sounds.


Oxygenation has been acceptable and patient has been ambulatory.  She has been 

otherwise hemodynamically stable I would no further interventions been planned 

she is been discharged home for outpatient follow-up.





Physical Exam


Vital Signs: 


 











Temp Pulse Resp BP Pulse Ox


 


 98.3 F   79   14   123/59 L  95 


 


 09/24/18 13:14  09/24/18 13:14  09/24/18 13:14  09/24/18 13:14  09/24/18 13:14








 Intake & Output











 09/23/18 09/24/18 09/25/18





 06:59 06:59 06:59


 


Intake Total 3111 2702 150


 


Output Total 2900 2400 


 


Balance 211 302 150











General appearance: PRESENT: no acute distress, well-developed, well-nourished


Head exam: PRESENT: atraumatic, normocephalic


Eye exam: PRESENT: conjunctiva pink, EOMI, PERRLA.  ABSENT: scleral icterus


Ear exam: PRESENT: normal external ear exam


Mouth exam: PRESENT: moist, tongue midline


Neck exam: ABSENT: carotid bruit, JVD, lymphadenopathy, thyromegaly


Respiratory exam: PRESENT: decreased breath sounds, rhonchi, unlabored.  ABSENT

: rales, wheezes


Cardiovascular exam: PRESENT: RRR.  ABSENT: diastolic murmur, rubs, systolic 

murmur


Pulses: PRESENT: normal dorsalis pedis pul


Vascular exam: PRESENT: normal capillary refill


GI/Abdominal exam: PRESENT: normal bowel sounds, soft.  ABSENT: distended, 

guarding, mass, organolmegaly, rebound, tenderness


Rectal exam: PRESENT: deferred


Extremities exam: PRESENT: full ROM.  ABSENT: calf tenderness, clubbing, pedal 

edema


Neurological exam: PRESENT: alert, awake, oriented to person, oriented to place

, oriented to time, oriented to situation, CN II-XII grossly intact.  ABSENT: 

motor sensory deficit


Psychiatric exam: PRESENT: appropriate affect, normal mood.  ABSENT: homicidal 

ideation, suicidal ideation


Skin exam: PRESENT: dry, intact, warm.  ABSENT: cyanosis, rash





Results


Laboratory Results: 


 





 09/23/18 04:25 





 09/23/18 04:25 








Impressions: 


 





Chest X-Ray  09/19/18 21:41


IMPRESSION: No acute disease. 


 








Chest CT  09/22/18 00:00


IMPRESSION:  NORMAL CT OF THE CHEST WITH IV CONTRAST.


 














Qualifiers





- *


PATIENT BEING DISCHARGED WITH ANY OF THE FOLLOWING DIAGNOSIS: No





Plan


Time Spent: Greater than 30 Minutes

## 2018-12-18 ENCOUNTER — HOSPITAL ENCOUNTER (OUTPATIENT)
Dept: HOSPITAL 62 - RAD | Age: 73
End: 2018-12-18
Attending: OPHTHALMOLOGY
Payer: MEDICARE

## 2018-12-18 DIAGNOSIS — H40.1231: ICD-10-CM

## 2018-12-18 DIAGNOSIS — A69.22: Primary | ICD-10-CM

## 2018-12-18 PROCEDURE — 70553 MRI BRAIN STEM W/O & W/DYE: CPT

## 2018-12-18 PROCEDURE — 82565 ASSAY OF CREATININE: CPT

## 2018-12-18 PROCEDURE — A9576 INJ PROHANCE MULTIPACK: HCPCS

## 2018-12-18 NOTE — RADIOLOGY REPORT (SQ)
EXAM DESCRIPTION:  MRI HEAD COMBO



COMPLETED DATE/TIME:  12/18/2018 2:41 pm



REASON FOR STUDY:  LOW-TENSION GLAUCOMA, BILATERAL, MILD STAGE (H40.1231) H40.1231  LOW-TENSION GLAUC
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



COMPARISON:  None.



TECHNIQUE:  Multiplanar imaging includes noncontrasted T1, T2, FLAIR, diffusion with ADC map and post
gadolinium contrast T1 sequences. Images stored on PACS.

Additional thin section coronal and axial T2, T1 precontrast, T1 post contrasted images were obtained
 through the globes and orbits.



CONTRAST TYPE AND DOSE:  14 mL Dotarem.



RENAL FUNCTION:  GFR > 60.



LIMITATIONS:  None.



FINDINGS:  ANATOMY: No developmental anomalies. Normal vascular flow voids. Pituitary fossa normal.

CSF SPACES: Normal in size and contour. No hemorrhage.

CEREBRUM: Sulci and gyri normal in size and contour. Normal white matter signal on FLAIR imaging. No 
evidence of hemorrhage, mass, or extraaxial fluid collection. No abnormal enhancement post contrast.

POSTERIOR FOSSA: No signal alteration. No hemorrhage. No edema, masses, or mass effect. Internal philippe
tory canals, cerebellopontine angles, mastoids normal. No enhancing lesions. No abnormal enhancement 
post contrast.

DIFFUSION IMAGING: Negative for acute or subacute infarction.

ORBITS: Globes are normal.  Optic nerves are unremarkable.  No masses or abnormal enhancement.  No ab
normal intrinsic signal.  Extraocular muscles, lacrimal glands, intra and extraconal fat are all unre
markable.

PARANASAL SINUSES: No fluid levels. Mucosa normal.

OTHER: No other significant finding.



IMPRESSION:  NORMAL MRI OF THE BRAIN AND ORBITS WITHOUT AND WITH INTRAVENOUS GADOLINIUM CONTRAST.

EVIDENCE OF ACUTE STROKE: NO.



TECHNICAL DOCUMENTATION:  JOB ID:  [id number]

 [employer]- All Rights Reserved



Reading location - IP/workstation name: [facility]

## 2020-12-10 ENCOUNTER — HOSPITAL ENCOUNTER (OUTPATIENT)
Dept: HOSPITAL 62 - SC | Age: 75
Discharge: HOME | End: 2020-12-10
Attending: OPHTHALMOLOGY
Payer: MEDICARE

## 2020-12-10 DIAGNOSIS — H40.1231: ICD-10-CM

## 2020-12-10 DIAGNOSIS — A69.22: ICD-10-CM

## 2020-12-10 DIAGNOSIS — M19.90: ICD-10-CM

## 2020-12-10 DIAGNOSIS — D31.31: ICD-10-CM

## 2020-12-10 DIAGNOSIS — E03.9: ICD-10-CM

## 2020-12-10 DIAGNOSIS — H57.03: ICD-10-CM

## 2020-12-10 DIAGNOSIS — A69.20: ICD-10-CM

## 2020-12-10 DIAGNOSIS — H04.123: ICD-10-CM

## 2020-12-10 DIAGNOSIS — H17.89: ICD-10-CM

## 2020-12-10 DIAGNOSIS — H25.811: Primary | ICD-10-CM

## 2020-12-10 PROCEDURE — 66982 XCAPSL CTRC RMVL CPLX WO ECP: CPT

## 2020-12-10 PROCEDURE — V2632 POST CHMBR INTRAOCULAR LENS: HCPCS

## 2020-12-10 RX ADMIN — CYCLOPENTOLATE HYDROCHLORIDE AND PHENYLEPHRINE HYDROCHLORIDE PRN DROP: 2; 10 SOLUTION/ DROPS OPHTHALMIC at 10:10

## 2020-12-10 RX ADMIN — CYCLOPENTOLATE HYDROCHLORIDE AND PHENYLEPHRINE HYDROCHLORIDE PRN DROP: 2; 10 SOLUTION/ DROPS OPHTHALMIC at 10:20

## 2020-12-10 RX ADMIN — CYCLOPENTOLATE HYDROCHLORIDE AND PHENYLEPHRINE HYDROCHLORIDE PRN DROP: 2; 10 SOLUTION/ DROPS OPHTHALMIC at 10:32

## 2020-12-10 RX ADMIN — TETRACAINE HYDROCHLORIDE PRN DROP: 5 SOLUTION OPHTHALMIC at 10:10

## 2020-12-10 RX ADMIN — TROPICAMIDE PRN DROP: 10 SOLUTION/ DROPS OPHTHALMIC at 10:32

## 2020-12-10 RX ADMIN — BESIFLOXACIN PRN DROP: 6 SUSPENSION OPHTHALMIC at 11:08

## 2020-12-10 RX ADMIN — TETRACAINE HYDROCHLORIDE PRN DROP: 5 SOLUTION OPHTHALMIC at 10:47

## 2020-12-10 RX ADMIN — TETRACAINE HYDROCHLORIDE PRN DROP: 5 SOLUTION OPHTHALMIC at 10:32

## 2020-12-10 RX ADMIN — TROPICAMIDE PRN DROP: 10 SOLUTION/ DROPS OPHTHALMIC at 10:10

## 2020-12-10 RX ADMIN — BESIFLOXACIN PRN DROP: 6 SUSPENSION OPHTHALMIC at 10:20

## 2020-12-10 RX ADMIN — TROPICAMIDE PRN DROP: 10 SOLUTION/ DROPS OPHTHALMIC at 10:20

## 2020-12-10 RX ADMIN — BESIFLOXACIN PRN DROP: 6 SUSPENSION OPHTHALMIC at 10:10

## 2020-12-10 NOTE — OPERATIVE REPORT
Operative Report-[facility]


Operative Report: 





DATE OF SURGERY: 12/10/2020


PREOPERATIVE DIAGNOSIS: Cataract, right eye, Pupil Miosis


POSTOPERATIVE DIAGNOSIS: Cataract, right eye, Pupil miosis


 OPERATION:  Complex Cataract extraction with insertion of an IOL of the right 

eye and use of a maluygan ring due to pupil dilation of less than 4mm.


Intraocular Lens Model: [22.5 diopter SN 60 WF] underwent surgery for difficulty

seeing the television


SURGEON: Miguel Al MD


ANESTHESIA: Topical


PROCEDURE: After obtaining appropriate consent, the patient's right eye was 

prepped and draped in a sterile fashion as well as the surgeon in the sterile 

manner and cataract surgery was started. First a paracentesis blade was used to 

make a side-port incision. Viscoelastic was used to inflate the anterior 

chamber. Next a 2.4 mm incision was made with a 2.4 mm blade, clear corneal 

temporarily.  A malunion ring was inserted due to poor pupillary dilation A 

continuous capsulorrhexis was made using a cystotome and Utrata forceps. 

Following this hydrodissection was carried out to make the lens fully loose and 

mobile and it was rotated freely. Following this, a divide and conquer technique

was used to phacoemulsify the lens. The remaining cortex was removed with an 

irrigation/aspiration. Provisc was instilled into the capsular bag to inflate 

the bag. The intraocular lens was placed. The remaining viscoelastic material 

was removed with irrigation/aspiration. Following this, the incision was found 

to be watertight. Prior to making the capsulorhexis a maluygan ring was inserted

due to poor pupillary dilation. This was removed at the end of the case.  

Besivance and Cosopt was instilled into the eye and a protective shield was 

placed over the eye. The patient was returned to the postoperative recovery in a

stable condition.

## 2021-01-14 ENCOUNTER — HOSPITAL ENCOUNTER (OUTPATIENT)
Dept: HOSPITAL 62 - SC | Age: 76
Discharge: HOME | End: 2021-01-14
Attending: OPHTHALMOLOGY
Payer: MEDICARE

## 2021-01-14 DIAGNOSIS — Z79.899: ICD-10-CM

## 2021-01-14 DIAGNOSIS — M19.90: ICD-10-CM

## 2021-01-14 DIAGNOSIS — E05.90: ICD-10-CM

## 2021-01-14 DIAGNOSIS — H57.03: ICD-10-CM

## 2021-01-14 DIAGNOSIS — H25.812: Primary | ICD-10-CM

## 2021-01-14 DIAGNOSIS — Z96.1: ICD-10-CM

## 2021-01-14 PROCEDURE — V2632 POST CHMBR INTRAOCULAR LENS: HCPCS

## 2021-01-14 PROCEDURE — 00142 ANES PX ON EYE LENS SURGERY: CPT

## 2021-01-14 PROCEDURE — 66982 XCAPSL CTRC RMVL CPLX WO ECP: CPT

## 2021-01-14 RX ADMIN — PREDNISOLONE ACETATE PRN DROP: 10 SUSPENSION/ DROPS OPHTHALMIC at 00:00

## 2021-01-14 RX ADMIN — PREDNISOLONE ACETATE PRN DROP: 10 SUSPENSION/ DROPS OPHTHALMIC at 11:20

## 2021-01-14 RX ADMIN — TETRACAINE HYDROCHLORIDE PRN DROP: 5 SOLUTION OPHTHALMIC at 10:45

## 2021-01-14 RX ADMIN — DORZOLAMIDE HYDROCHLORIDE AND TIMOLOL MALEATE PRN DROP: 20; 5 SOLUTION OPHTHALMIC at 00:00

## 2021-01-14 RX ADMIN — TROPICAMIDE PRN DROP: 10 SOLUTION/ DROPS OPHTHALMIC at 10:45

## 2021-01-14 RX ADMIN — TROPICAMIDE PRN DROP: 10 SOLUTION/ DROPS OPHTHALMIC at 10:30

## 2021-01-14 RX ADMIN — TETRACAINE HYDROCHLORIDE PRN DROP: 5 SOLUTION OPHTHALMIC at 10:20

## 2021-01-14 RX ADMIN — TETRACAINE HYDROCHLORIDE PRN DROP: 5 SOLUTION OPHTHALMIC at 10:51

## 2021-01-14 RX ADMIN — CYCLOPENTOLATE HYDROCHLORIDE AND PHENYLEPHRINE HYDROCHLORIDE PRN DROP: 2; 10 SOLUTION/ DROPS OPHTHALMIC at 10:30

## 2021-01-14 RX ADMIN — BESIFLOXACIN PRN DROP: 6 SUSPENSION OPHTHALMIC at 00:00

## 2021-01-14 RX ADMIN — BESIFLOXACIN PRN DROP: 6 SUSPENSION OPHTHALMIC at 10:30

## 2021-01-14 RX ADMIN — BESIFLOXACIN PRN DROP: 6 SUSPENSION OPHTHALMIC at 11:20

## 2021-01-14 RX ADMIN — CYCLOPENTOLATE HYDROCHLORIDE AND PHENYLEPHRINE HYDROCHLORIDE PRN DROP: 2; 10 SOLUTION/ DROPS OPHTHALMIC at 10:45

## 2021-01-14 RX ADMIN — DORZOLAMIDE HYDROCHLORIDE AND TIMOLOL MALEATE PRN DROP: 20; 5 SOLUTION OPHTHALMIC at 11:20

## 2021-01-14 RX ADMIN — BESIFLOXACIN PRN DROP: 6 SUSPENSION OPHTHALMIC at 10:20

## 2021-01-14 RX ADMIN — CYCLOPENTOLATE HYDROCHLORIDE AND PHENYLEPHRINE HYDROCHLORIDE PRN DROP: 2; 10 SOLUTION/ DROPS OPHTHALMIC at 10:20

## 2021-01-14 RX ADMIN — TROPICAMIDE PRN DROP: 10 SOLUTION/ DROPS OPHTHALMIC at 10:20

## 2021-01-14 NOTE — OPERATIVE REPORT
[facility]


Operative Report: 





DATE OF SURGERY: 1/14/2021


PREOPERATIVE DIAGNOSIS: Cataract, left eye, Pupil Miosis


POSTOPERATIVE DIAGNOSIS: Cataract, left eye, Pupil miosis


 OPERATION:  Complex Cataract extraction with insertion of an IOL of the left 

eye and use of a maluygan ring due to pupil dilation of less than 4mm.


Intraocular Lens Model: [22.5 SN 60 WF] Patient underwent surgery for difficulty

seeing the road signs


SURGEON: Miguel Al MD


ANESTHESIA: Topical


PROCEDURE: After obtaining appropriate consent, the patient's left eye was 

prepped and draped in a sterile fashion as well as the surgeon in the sterile 

manner and cataract surgery was started. First a paracentesis blade was used to 

make a side-port incision. Viscoelastic was used to inflate the anterior 

chamber. Next a 2.4 mm incision was made with a 2.4 mm blade, clear corneal 

temporarily.  Prior to making the capsulorrhexis a maluygion ring was inserted 

due to poor pupillary dilation.  A continuous capsulorrhexis was made using a 

cystotome and Utrata forceps. Following this hydrodissection was carried out to 

make the lens fully loose and mobile and it was rotated freely. Following this, 

a divide and conquer technique was used to phacoemulsify the lens.. The 

remaining cortex was removed with an irrigation/aspiration. Provisc was 

instilled into the capsular bag to inflate the bag. The intraocular lens was 

placed. The remaining viscoelastic material was removed with 

irrigation/aspiration. Following this, the incision was found to be watertight. 

Prior to making the capsulorhexis a maluygan ring was inserted due to poor 

pupillary dilation. This was removed at the end of the case.  Besivance and 

Cosopt was instilled into the eye and a protective shield was placed over the 

eye. The patient was returned to the postoperative recovery in a stable 

condition.